# Patient Record
Sex: MALE | Race: WHITE | NOT HISPANIC OR LATINO
[De-identification: names, ages, dates, MRNs, and addresses within clinical notes are randomized per-mention and may not be internally consistent; named-entity substitution may affect disease eponyms.]

---

## 2020-05-31 ENCOUNTER — TRANSCRIPTION ENCOUNTER (OUTPATIENT)
Age: 50
End: 2020-05-31

## 2020-07-04 ENCOUNTER — EMERGENCY (EMERGENCY)
Facility: HOSPITAL | Age: 50
LOS: 1 days | Discharge: HOME | End: 2020-07-04
Attending: EMERGENCY MEDICINE | Admitting: EMERGENCY MEDICINE
Payer: COMMERCIAL

## 2020-07-04 VITALS
WEIGHT: 279.99 LBS | SYSTOLIC BLOOD PRESSURE: 138 MMHG | HEART RATE: 73 BPM | DIASTOLIC BLOOD PRESSURE: 65 MMHG | HEIGHT: 72 IN | TEMPERATURE: 98 F | RESPIRATION RATE: 18 BRPM | OXYGEN SATURATION: 97 %

## 2020-07-04 DIAGNOSIS — Z90.49 ACQUIRED ABSENCE OF OTHER SPECIFIED PARTS OF DIGESTIVE TRACT: Chronic | ICD-10-CM

## 2020-07-04 LAB
ALBUMIN SERPL ELPH-MCNC: 4.1 G/DL — SIGNIFICANT CHANGE UP (ref 3.3–5)
ALP SERPL-CCNC: 57 U/L — SIGNIFICANT CHANGE UP (ref 40–120)
ALT FLD-CCNC: 68 U/L — HIGH (ref 10–45)
ANION GAP SERPL CALC-SCNC: 13 MMOL/L — SIGNIFICANT CHANGE UP (ref 5–17)
APTT BLD: 29 SEC — SIGNIFICANT CHANGE UP (ref 27.5–35.5)
AST SERPL-CCNC: 37 U/L — SIGNIFICANT CHANGE UP (ref 10–40)
BASOPHILS # BLD AUTO: 0.03 K/UL — SIGNIFICANT CHANGE UP (ref 0–0.2)
BASOPHILS NFR BLD AUTO: 0.4 % — SIGNIFICANT CHANGE UP (ref 0–2)
BILIRUB SERPL-MCNC: 0.5 MG/DL — SIGNIFICANT CHANGE UP (ref 0.2–1.2)
BUN SERPL-MCNC: 16 MG/DL — SIGNIFICANT CHANGE UP (ref 7–23)
CALCIUM SERPL-MCNC: 8.7 MG/DL — SIGNIFICANT CHANGE UP (ref 8.4–10.5)
CHLORIDE SERPL-SCNC: 105 MMOL/L — SIGNIFICANT CHANGE UP (ref 96–108)
CO2 SERPL-SCNC: 22 MMOL/L — SIGNIFICANT CHANGE UP (ref 22–31)
CREAT SERPL-MCNC: 1.23 MG/DL — SIGNIFICANT CHANGE UP (ref 0.5–1.3)
EOSINOPHIL # BLD AUTO: 0.07 K/UL — SIGNIFICANT CHANGE UP (ref 0–0.5)
EOSINOPHIL NFR BLD AUTO: 1 % — SIGNIFICANT CHANGE UP (ref 0–6)
GLUCOSE SERPL-MCNC: 135 MG/DL — HIGH (ref 70–99)
HCT VFR BLD CALC: 40.1 % — SIGNIFICANT CHANGE UP (ref 39–50)
HGB BLD-MCNC: 14.6 G/DL — SIGNIFICANT CHANGE UP (ref 13–17)
IMM GRANULOCYTES NFR BLD AUTO: 0.3 % — SIGNIFICANT CHANGE UP (ref 0–1.5)
INR BLD: 1.02 — SIGNIFICANT CHANGE UP (ref 0.88–1.16)
LIDOCAIN IGE QN: 36 U/L — SIGNIFICANT CHANGE UP (ref 7–60)
LYMPHOCYTES # BLD AUTO: 1.55 K/UL — SIGNIFICANT CHANGE UP (ref 1–3.3)
LYMPHOCYTES # BLD AUTO: 21.4 % — SIGNIFICANT CHANGE UP (ref 13–44)
MCHC RBC-ENTMCNC: 34.4 PG — HIGH (ref 27–34)
MCHC RBC-ENTMCNC: 36.4 GM/DL — HIGH (ref 32–36)
MCV RBC AUTO: 94.6 FL — SIGNIFICANT CHANGE UP (ref 80–100)
MONOCYTES # BLD AUTO: 0.8 K/UL — SIGNIFICANT CHANGE UP (ref 0–0.9)
MONOCYTES NFR BLD AUTO: 11 % — SIGNIFICANT CHANGE UP (ref 2–14)
NEUTROPHILS # BLD AUTO: 4.77 K/UL — SIGNIFICANT CHANGE UP (ref 1.8–7.4)
NEUTROPHILS NFR BLD AUTO: 65.9 % — SIGNIFICANT CHANGE UP (ref 43–77)
NRBC # BLD: 0 /100 WBCS — SIGNIFICANT CHANGE UP (ref 0–0)
PLATELET # BLD AUTO: 187 K/UL — SIGNIFICANT CHANGE UP (ref 150–400)
POTASSIUM SERPL-MCNC: 4 MMOL/L — SIGNIFICANT CHANGE UP (ref 3.5–5.3)
POTASSIUM SERPL-SCNC: 4 MMOL/L — SIGNIFICANT CHANGE UP (ref 3.5–5.3)
PROT SERPL-MCNC: 7 G/DL — SIGNIFICANT CHANGE UP (ref 6–8.3)
PROTHROM AB SERPL-ACNC: 12.2 SEC — SIGNIFICANT CHANGE UP (ref 10.6–13.6)
RBC # BLD: 4.24 M/UL — SIGNIFICANT CHANGE UP (ref 4.2–5.8)
RBC # FLD: 11.7 % — SIGNIFICANT CHANGE UP (ref 10.3–14.5)
SODIUM SERPL-SCNC: 140 MMOL/L — SIGNIFICANT CHANGE UP (ref 135–145)
WBC # BLD: 7.24 K/UL — SIGNIFICANT CHANGE UP (ref 3.8–10.5)
WBC # FLD AUTO: 7.24 K/UL — SIGNIFICANT CHANGE UP (ref 3.8–10.5)

## 2020-07-04 PROCEDURE — 71045 X-RAY EXAM CHEST 1 VIEW: CPT | Mod: 26

## 2020-07-04 PROCEDURE — 99285 EMERGENCY DEPT VISIT HI MDM: CPT

## 2020-07-04 PROCEDURE — 74177 CT ABD & PELVIS W/CONTRAST: CPT | Mod: 26

## 2020-07-04 RX ORDER — ONDANSETRON 8 MG/1
4 TABLET, FILM COATED ORAL ONCE
Refills: 0 | Status: COMPLETED | OUTPATIENT
Start: 2020-07-04 | End: 2020-07-04

## 2020-07-04 RX ORDER — SODIUM CHLORIDE 9 MG/ML
1000 INJECTION INTRAMUSCULAR; INTRAVENOUS; SUBCUTANEOUS ONCE
Refills: 0 | Status: COMPLETED | OUTPATIENT
Start: 2020-07-04 | End: 2020-07-04

## 2020-07-04 RX ORDER — MORPHINE SULFATE 50 MG/1
6 CAPSULE, EXTENDED RELEASE ORAL ONCE
Refills: 0 | Status: DISCONTINUED | OUTPATIENT
Start: 2020-07-04 | End: 2020-07-04

## 2020-07-04 RX ADMIN — ONDANSETRON 4 MILLIGRAM(S): 8 TABLET, FILM COATED ORAL at 22:24

## 2020-07-04 RX ADMIN — SODIUM CHLORIDE 2000 MILLILITER(S): 9 INJECTION INTRAMUSCULAR; INTRAVENOUS; SUBCUTANEOUS at 22:29

## 2020-07-04 RX ADMIN — SODIUM CHLORIDE 2000 MILLILITER(S): 9 INJECTION INTRAMUSCULAR; INTRAVENOUS; SUBCUTANEOUS at 23:48

## 2020-07-04 RX ADMIN — SODIUM CHLORIDE 1000 MILLILITER(S): 9 INJECTION INTRAMUSCULAR; INTRAVENOUS; SUBCUTANEOUS at 23:34

## 2020-07-04 RX ADMIN — MORPHINE SULFATE 6 MILLIGRAM(S): 50 CAPSULE, EXTENDED RELEASE ORAL at 22:24

## 2020-07-04 RX ADMIN — MORPHINE SULFATE 6 MILLIGRAM(S): 50 CAPSULE, EXTENDED RELEASE ORAL at 22:56

## 2020-07-04 NOTE — ED PROVIDER NOTE - PATIENT PORTAL LINK FT
You can access the FollowMyHealth Patient Portal offered by [facility] by registering at the following website: [email or web address]. By joining [email or web address]’s FollowMyHealth portal, you will also be able to view your health information using other applications (apps) compatible with our system.

## 2020-07-04 NOTE — ED PROVIDER NOTE - CLINICAL SUMMARY MEDICAL DECISION MAKING FREE TEXT BOX
here w/ severe abdominal pain, n/v, stopped passing flatus. will check CT scan to r/o SBO, other dangerous pathology

## 2020-07-04 NOTE — ED ADULT NURSE NOTE - OBJECTIVE STATEMENT
pt to ED for abd. pain started 7am this morning accompanied n/v/d, last BM 2 hours ago, h/o hemicolectomy d/t volvulus, appendectomy. denies blood in urine/vomitus/stool, dysuria, sob, cp, dizziness, fever/chills/cough.

## 2020-07-04 NOTE — ED PROVIDER NOTE - OBJECTIVE STATEMENT
here w/ severe pain that started today 7am, unable to keep dinner down. last BM a few hours ago but felt abnl, pt cannot express how. states no flatus since. no sob/cp, no fevers/chills, no urinary symptoms. no dizziness. states similar to pain when he had volvulus. [place]

## 2020-07-04 NOTE — ED PROVIDER NOTE - NSFOLLOWUPINSTRUCTIONS_ED_ALL_ED_FT
Kidney Stones    Kidney stones (urolithiasis) are crystal deposits that form inside your kidneys. Pain is caused by the stone moving through the urinary tract, causing spasms of the ureter. Drink enough water and fluids to keep your urine clear or pale yellow. This will help you to pass the stone or stone fragments. If provided a strainer, strain all urine and keep all particulate matter and stones for a follow up appointment with a urologist.    SEEK IMMEDIATE MEDICAL CARE IF YOU HAVE ANY OF THE FOLLOWING SYMPTOMS: pain not controlled with medication, fever/chills, worsening vomiting, inability to urinate, or dizziness/lightheadedness.     Please follow up with your urologist, call Monday for an appointment

## 2020-07-05 VITALS
OXYGEN SATURATION: 97 % | SYSTOLIC BLOOD PRESSURE: 156 MMHG | TEMPERATURE: 98 F | RESPIRATION RATE: 18 BRPM | DIASTOLIC BLOOD PRESSURE: 92 MMHG | HEART RATE: 66 BPM

## 2020-07-05 LAB
BLD GP AB SCN SERPL QL: NEGATIVE — SIGNIFICANT CHANGE UP
RH IG SCN BLD-IMP: POSITIVE — SIGNIFICANT CHANGE UP
SARS-COV-2 RNA SPEC QL NAA+PROBE: SIGNIFICANT CHANGE UP

## 2020-07-05 PROCEDURE — 99284 EMERGENCY DEPT VISIT MOD MDM: CPT | Mod: 25

## 2020-07-05 PROCEDURE — 80053 COMPREHEN METABOLIC PANEL: CPT

## 2020-07-05 PROCEDURE — 85610 PROTHROMBIN TIME: CPT

## 2020-07-05 PROCEDURE — 86901 BLOOD TYPING SEROLOGIC RH(D): CPT

## 2020-07-05 PROCEDURE — 96361 HYDRATE IV INFUSION ADD-ON: CPT

## 2020-07-05 PROCEDURE — 96376 TX/PRO/DX INJ SAME DRUG ADON: CPT

## 2020-07-05 PROCEDURE — 36415 COLL VENOUS BLD VENIPUNCTURE: CPT

## 2020-07-05 PROCEDURE — 96375 TX/PRO/DX INJ NEW DRUG ADDON: CPT

## 2020-07-05 PROCEDURE — U0003: CPT

## 2020-07-05 PROCEDURE — 85730 THROMBOPLASTIN TIME PARTIAL: CPT

## 2020-07-05 PROCEDURE — 85025 COMPLETE CBC W/AUTO DIFF WBC: CPT

## 2020-07-05 PROCEDURE — 86850 RBC ANTIBODY SCREEN: CPT

## 2020-07-05 PROCEDURE — 83690 ASSAY OF LIPASE: CPT

## 2020-07-05 PROCEDURE — 74177 CT ABD & PELVIS W/CONTRAST: CPT

## 2020-07-05 PROCEDURE — 71045 X-RAY EXAM CHEST 1 VIEW: CPT

## 2020-07-05 PROCEDURE — 96374 THER/PROPH/DIAG INJ IV PUSH: CPT

## 2020-07-05 RX ORDER — TAMSULOSIN HYDROCHLORIDE 0.4 MG/1
1 CAPSULE ORAL
Qty: 7 | Refills: 0
Start: 2020-07-05 | End: 2020-07-11

## 2020-07-05 RX ORDER — IBUPROFEN 200 MG
1 TABLET ORAL
Qty: 16 | Refills: 0
Start: 2020-07-05 | End: 2020-07-08

## 2020-07-05 RX ORDER — ONDANSETRON 8 MG/1
1 TABLET, FILM COATED ORAL
Qty: 9 | Refills: 0
Start: 2020-07-05 | End: 2020-07-07

## 2020-07-05 RX ORDER — TAMSULOSIN HYDROCHLORIDE 0.4 MG/1
0.4 CAPSULE ORAL ONCE
Refills: 0 | Status: COMPLETED | OUTPATIENT
Start: 2020-07-05 | End: 2020-07-05

## 2020-07-05 RX ORDER — KETOROLAC TROMETHAMINE 30 MG/ML
30 SYRINGE (ML) INJECTION ONCE
Refills: 0 | Status: DISCONTINUED | OUTPATIENT
Start: 2020-07-05 | End: 2020-07-05

## 2020-07-05 RX ORDER — ACETAMINOPHEN 500 MG
1000 TABLET ORAL ONCE
Refills: 0 | Status: COMPLETED | OUTPATIENT
Start: 2020-07-05 | End: 2020-07-05

## 2020-07-05 RX ADMIN — Medication 30 MILLIGRAM(S): at 00:02

## 2020-07-05 RX ADMIN — Medication 400 MILLIGRAM(S): at 00:02

## 2020-07-05 RX ADMIN — TAMSULOSIN HYDROCHLORIDE 0.4 MILLIGRAM(S): 0.4 CAPSULE ORAL at 00:43

## 2020-07-07 ENCOUNTER — INPATIENT (INPATIENT)
Facility: HOSPITAL | Age: 50
LOS: 0 days | Discharge: HOME | DRG: 694 | End: 2020-07-08
Attending: UROLOGY | Admitting: UROLOGY
Payer: COMMERCIAL

## 2020-07-07 ENCOUNTER — APPOINTMENT (OUTPATIENT)
Dept: UROLOGY | Facility: CLINIC | Age: 50
End: 2020-07-07
Payer: COMMERCIAL

## 2020-07-07 ENCOUNTER — TRANSCRIPTION ENCOUNTER (OUTPATIENT)
Age: 50
End: 2020-07-07

## 2020-07-07 VITALS
RESPIRATION RATE: 18 BRPM | HEART RATE: 89 BPM | TEMPERATURE: 98 F | WEIGHT: 279.99 LBS | DIASTOLIC BLOOD PRESSURE: 91 MMHG | HEIGHT: 72 IN | OXYGEN SATURATION: 96 % | SYSTOLIC BLOOD PRESSURE: 158 MMHG

## 2020-07-07 DIAGNOSIS — N23 UNSPECIFIED RENAL COLIC: ICD-10-CM

## 2020-07-07 DIAGNOSIS — Z90.49 ACQUIRED ABSENCE OF OTHER SPECIFIED PARTS OF DIGESTIVE TRACT: Chronic | ICD-10-CM

## 2020-07-07 LAB
ALBUMIN SERPL ELPH-MCNC: 3.6 G/DL — SIGNIFICANT CHANGE UP (ref 3.3–5)
ALP SERPL-CCNC: 52 U/L — SIGNIFICANT CHANGE UP (ref 40–120)
ALT FLD-CCNC: 36 U/L — SIGNIFICANT CHANGE UP (ref 10–45)
ANION GAP SERPL CALC-SCNC: 11 MMOL/L — SIGNIFICANT CHANGE UP (ref 5–17)
APTT BLD: 29.9 SEC — SIGNIFICANT CHANGE UP (ref 27.5–35.5)
AST SERPL-CCNC: 20 U/L — SIGNIFICANT CHANGE UP (ref 10–40)
BASOPHILS # BLD AUTO: 0.02 K/UL — SIGNIFICANT CHANGE UP (ref 0–0.2)
BASOPHILS NFR BLD AUTO: 0.3 % — SIGNIFICANT CHANGE UP (ref 0–2)
BILIRUB SERPL-MCNC: 0.8 MG/DL — SIGNIFICANT CHANGE UP (ref 0.2–1.2)
BUN SERPL-MCNC: 15 MG/DL — SIGNIFICANT CHANGE UP (ref 7–23)
CALCIUM SERPL-MCNC: 8.4 MG/DL — SIGNIFICANT CHANGE UP (ref 8.4–10.5)
CHLORIDE SERPL-SCNC: 104 MMOL/L — SIGNIFICANT CHANGE UP (ref 96–108)
CO2 SERPL-SCNC: 27 MMOL/L — SIGNIFICANT CHANGE UP (ref 22–31)
CREAT SERPL-MCNC: 1.83 MG/DL — HIGH (ref 0.5–1.3)
EOSINOPHIL # BLD AUTO: 0.1 K/UL — SIGNIFICANT CHANGE UP (ref 0–0.5)
EOSINOPHIL NFR BLD AUTO: 1.5 % — SIGNIFICANT CHANGE UP (ref 0–6)
GLUCOSE SERPL-MCNC: 106 MG/DL — HIGH (ref 70–99)
HCT VFR BLD CALC: 37.2 % — LOW (ref 39–50)
HGB BLD-MCNC: 13.1 G/DL — SIGNIFICANT CHANGE UP (ref 13–17)
IMM GRANULOCYTES NFR BLD AUTO: 0.4 % — SIGNIFICANT CHANGE UP (ref 0–1.5)
INR BLD: 1.05 — SIGNIFICANT CHANGE UP (ref 0.88–1.16)
LYMPHOCYTES # BLD AUTO: 1.09 K/UL — SIGNIFICANT CHANGE UP (ref 1–3.3)
LYMPHOCYTES # BLD AUTO: 15.9 % — SIGNIFICANT CHANGE UP (ref 13–44)
MCHC RBC-ENTMCNC: 34.1 PG — HIGH (ref 27–34)
MCHC RBC-ENTMCNC: 35.2 GM/DL — SIGNIFICANT CHANGE UP (ref 32–36)
MCV RBC AUTO: 96.9 FL — SIGNIFICANT CHANGE UP (ref 80–100)
MONOCYTES # BLD AUTO: 0.77 K/UL — SIGNIFICANT CHANGE UP (ref 0–0.9)
MONOCYTES NFR BLD AUTO: 11.2 % — SIGNIFICANT CHANGE UP (ref 2–14)
NEUTROPHILS # BLD AUTO: 4.84 K/UL — SIGNIFICANT CHANGE UP (ref 1.8–7.4)
NEUTROPHILS NFR BLD AUTO: 70.7 % — SIGNIFICANT CHANGE UP (ref 43–77)
NRBC # BLD: 0 /100 WBCS — SIGNIFICANT CHANGE UP (ref 0–0)
PLATELET # BLD AUTO: 147 K/UL — LOW (ref 150–400)
POTASSIUM SERPL-MCNC: 3.8 MMOL/L — SIGNIFICANT CHANGE UP (ref 3.5–5.3)
POTASSIUM SERPL-SCNC: 3.8 MMOL/L — SIGNIFICANT CHANGE UP (ref 3.5–5.3)
PROT SERPL-MCNC: 6.5 G/DL — SIGNIFICANT CHANGE UP (ref 6–8.3)
PROTHROM AB SERPL-ACNC: 12.6 SEC — SIGNIFICANT CHANGE UP (ref 10.6–13.6)
RBC # BLD: 3.84 M/UL — LOW (ref 4.2–5.8)
RBC # FLD: 11.6 % — SIGNIFICANT CHANGE UP (ref 10.3–14.5)
SARS-COV-2 RNA SPEC QL NAA+PROBE: SIGNIFICANT CHANGE UP
SODIUM SERPL-SCNC: 142 MMOL/L — SIGNIFICANT CHANGE UP (ref 135–145)
WBC # BLD: 6.85 K/UL — SIGNIFICANT CHANGE UP (ref 3.8–10.5)
WBC # FLD AUTO: 6.85 K/UL — SIGNIFICANT CHANGE UP (ref 3.8–10.5)

## 2020-07-07 PROCEDURE — 93010 ELECTROCARDIOGRAM REPORT: CPT | Mod: NC

## 2020-07-07 PROCEDURE — 99204 OFFICE O/P NEW MOD 45 MIN: CPT | Mod: 95

## 2020-07-07 PROCEDURE — 71045 X-RAY EXAM CHEST 1 VIEW: CPT | Mod: 26

## 2020-07-07 PROCEDURE — 99285 EMERGENCY DEPT VISIT HI MDM: CPT

## 2020-07-07 RX ORDER — OXYCODONE AND ACETAMINOPHEN 5; 325 MG/1; MG/1
1 TABLET ORAL EVERY 4 HOURS
Refills: 0 | Status: DISCONTINUED | OUTPATIENT
Start: 2020-07-07 | End: 2020-07-08

## 2020-07-07 RX ORDER — SODIUM CHLORIDE 9 MG/ML
1000 INJECTION INTRAMUSCULAR; INTRAVENOUS; SUBCUTANEOUS ONCE
Refills: 0 | Status: COMPLETED | OUTPATIENT
Start: 2020-07-07 | End: 2020-07-07

## 2020-07-07 RX ORDER — ONDANSETRON 8 MG/1
4 TABLET, FILM COATED ORAL ONCE
Refills: 0 | Status: COMPLETED | OUTPATIENT
Start: 2020-07-07 | End: 2020-07-07

## 2020-07-07 RX ORDER — ONDANSETRON 8 MG/1
4 TABLET, FILM COATED ORAL EVERY 8 HOURS
Refills: 0 | Status: DISCONTINUED | OUTPATIENT
Start: 2020-07-07 | End: 2020-07-08

## 2020-07-07 RX ORDER — SODIUM CHLORIDE 9 MG/ML
1000 INJECTION INTRAMUSCULAR; INTRAVENOUS; SUBCUTANEOUS
Refills: 0 | Status: DISCONTINUED | OUTPATIENT
Start: 2020-07-07 | End: 2020-07-08

## 2020-07-07 RX ORDER — ACETAMINOPHEN 500 MG
650 TABLET ORAL EVERY 6 HOURS
Refills: 0 | Status: DISCONTINUED | OUTPATIENT
Start: 2020-07-07 | End: 2020-07-08

## 2020-07-07 RX ORDER — LEVOTHYROXINE SODIUM 125 MCG
75 TABLET ORAL DAILY
Refills: 0 | Status: DISCONTINUED | OUTPATIENT
Start: 2020-07-07 | End: 2020-07-08

## 2020-07-07 RX ORDER — HYDROMORPHONE HYDROCHLORIDE 2 MG/ML
0.5 INJECTION INTRAMUSCULAR; INTRAVENOUS; SUBCUTANEOUS EVERY 4 HOURS
Refills: 0 | Status: DISCONTINUED | OUTPATIENT
Start: 2020-07-07 | End: 2020-07-08

## 2020-07-07 RX ORDER — KETOROLAC TROMETHAMINE 30 MG/ML
15 SYRINGE (ML) INJECTION ONCE
Refills: 0 | Status: DISCONTINUED | OUTPATIENT
Start: 2020-07-07 | End: 2020-07-07

## 2020-07-07 RX ADMIN — HYDROMORPHONE HYDROCHLORIDE 0.5 MILLIGRAM(S): 2 INJECTION INTRAMUSCULAR; INTRAVENOUS; SUBCUTANEOUS at 23:00

## 2020-07-07 RX ADMIN — OXYCODONE AND ACETAMINOPHEN 1 TABLET(S): 5; 325 TABLET ORAL at 20:41

## 2020-07-07 RX ADMIN — Medication 650 MILLIGRAM(S): at 23:01

## 2020-07-07 RX ADMIN — SODIUM CHLORIDE 1000 MILLILITER(S): 9 INJECTION INTRAMUSCULAR; INTRAVENOUS; SUBCUTANEOUS at 14:27

## 2020-07-07 RX ADMIN — SODIUM CHLORIDE 1000 MILLILITER(S): 9 INJECTION INTRAMUSCULAR; INTRAVENOUS; SUBCUTANEOUS at 15:21

## 2020-07-07 RX ADMIN — OXYCODONE AND ACETAMINOPHEN 1 TABLET(S): 5; 325 TABLET ORAL at 16:38

## 2020-07-07 RX ADMIN — Medication 75 MICROGRAM(S): at 23:00

## 2020-07-07 RX ADMIN — SODIUM CHLORIDE 125 MILLILITER(S): 9 INJECTION INTRAMUSCULAR; INTRAVENOUS; SUBCUTANEOUS at 15:20

## 2020-07-07 RX ADMIN — ONDANSETRON 4 MILLIGRAM(S): 8 TABLET, FILM COATED ORAL at 14:27

## 2020-07-07 RX ADMIN — Medication 15 MILLIGRAM(S): at 14:27

## 2020-07-07 NOTE — ED PROVIDER NOTE - OBJECTIVE STATEMENT
pmhx hypothyroid, appendectomy, ascending colon resection, presenting to the ed with llq pain. Began July 4 and diagnosed with a kidney stone on the left side. pt dc home from er and given prescriptions flomax, zofran motrin and percocet. states pain is persisting. states had follow up tele-medicine appointment with Dr. Davis, who recommended pt come to er for further evaluation and admission. pt does nto endorse fevers chills chest pain palpitations cough sob nausea, change in bowel movements. no covid like symptoms.

## 2020-07-07 NOTE — H&P ADULT - PROBLEM SELECTOR PLAN 1
-admit  -IVF's  -pain control  -zofran prn  -will add on to OR schedule 7/8 for cystoscopy, possible laser lithotripsy, stent

## 2020-07-07 NOTE — ED PROVIDER NOTE - PHYSICAL EXAMINATION
General: Patient is well developed and well nourised. Patient is alert and oriented to person, place and date. Patient is laying comfortably in stretcher and appears in no acute distress.  HEENT: Head is normocephalic and atraumatic. Pupils are equal, round and reactive to light and accommodation. Extraocular movements intact. No evidence of nystagmus, conjunctival injection, or scleral icterus. External ears symmetric and non-tender without evidence of discharge. Ear canals are clear without evidence of edema or erythema. Cone of light evident on tympanic membrade without evidence of erythema, retraction or bulge. No hemotympanum present bilaterally.  Nose is symmetric, non-tender, patent without evidence of discharge. Teeth in good repair. Uvula midline. Pharynx without erythema, edema, tonsillar enlargement or exudates.   Neck: Supple and nontender, with no evidence of lymphadenopathy. No cervical spinal tenderness. Full range of motion.  Heart: Regular rate and rhythm. No murmurs, rubs or gallops.   Lungs: Clear to auscultation bilaterally with equal chest expansion. No note of wheezes, rhonchi, rales. Equal chest expansion. No note of retractions.  Abdomen: Bowel sounds present in all four quadrants. Soft, non-tender, non-distended without signs of masses, rebound or guarding. No note of hepatosplenomegaly. No CVA tenderness bilaterally. Negative Sterling sign. No pain present over McBurney's point.  Musculoskeletal: No edema, erythema, ecchymosis, atrophy or deformity. Full range of motion in all four extremities.  No clubbing or cyanosis. No point tenderness to palpation.   Neuro: Cranial nerves intact. GCS 15. Moving all extremities without discomfort. Sensation intact. Gait steady  Skin: Warm, dry and intact without evidence of rashes, bruising, pallor, jaundice or cyanosis.   Psych: Mood and affect appropriate.

## 2020-07-07 NOTE — ED PROVIDER NOTE - CLINICAL SUMMARY MEDICAL DECISION MAKING FREE TEXT BOX
49 year old male pmhx hypothyroid, appendectomy, ascending colon resection, presenting to the ed with llq pain. Began July 4 and diagnosed with a kidney stone on the left side. pt dc home from er and given prescriptions flomax, zofran motrin and percocet. states pain is persisting. states had follow up tele-medicine appointment with Dr. Davis, who recommended pt come to er for further evaluation and admission. on exam pt appears uncomfortable. lungs cta, heart rrr, abdomen soft. urology consulted. plan for pre-op labs for admission for surgery tomorrow. pt and wife agreeable to plan.

## 2020-07-07 NOTE — ASSESSMENT
[FreeTextEntry1] : renal colic refractory to medical management\par he does not look well by video\par no fevers\par I advised him to come in to [facility] for admission for hydration and likely stone surgery\par We spoke at length today about the kidney stone. Options, including medical expulsive therapy, ESWL, ureteroscopic stone extraction and PCNL were discussed at length. Given the stone size and location, and inability to pass the stone ureteroscopy is likely to provide the highest stone free rates. Risks - including an inability to definitively treat the stone, infections including urosepsis, ureteral stricture and injury, gross hematuria, need for rodriguez catheterization, post op pain were all discussed. The patient very clearly understands the need for a temporary internal ureteral stent which MUST be removed or changed within 3 months of placement. \par will come in to ER for eval\par

## 2020-07-07 NOTE — HISTORY OF PRESENT ILLNESS
[Medical Office: ([facility])___] : at the medical office located in  [Home] : at home, [unfilled] , at the time of the visit. [Verbal consent obtained from patient] : the patient, [unfilled] [FreeTextEntry1] : The patient-doctor relationship has been established in a face to face fashion via real time video/audio HIPAA compliant communication using telemedicine software.  The patient's identity has been confirmed.  The patient was previously emailed a copy of the telemedicine consent.  They have had a chance to review and has now given verbal consent and has requested care to be assessed and treated through telemedicine and understands there maybe limitations in this process and they may need further follow up care in the office and or hospital settings.   \par  \par 49M seen in ER 7/4 with 3mm left distal ureteral stone. no leukocystosis. Creat 1.2. was sent home with percocet/flomax/antinausea medication. pain was initially up to 10/10 and remains persistent. +colic like. +nausea. poor PO intake. unable to work. no clear relief from pain medicaiton. no fevers. no dysuria. no hematuria. no previous stones. no family history prostate kidney bladder cancer.

## 2020-07-07 NOTE — ED ADULT NURSE NOTE - OBJECTIVE STATEMENT
Pt is 49y male presented w/ c/o L flank pain associated w/ nausea. Pt recently seen for similar symptoms and dx w/ kidney stones.

## 2020-07-07 NOTE — ED ADULT TRIAGE NOTE - CHIEF COMPLAINT QUOTE
Patient c/o left flank pain and nausea since saturday . Was sent by Dr. Davis for evaluation . History of kidney stone .

## 2020-07-07 NOTE — H&P ADULT - ASSESSMENT
50 yo male with 3mm L distal ureteral stone with mild hydronephrosis with severe L flank pain and nausea not relieved with pain medications.

## 2020-07-07 NOTE — H&P ADULT - HISTORY OF PRESENT ILLNESS
50 yo male presents to ER c/o severe L flank pain. pmhx hypothyroid, appendectomy, ascending colon resection, presenting to the ed with llq pain. Began July 4 and diagnosed with a kidney stone on the left side. pt dc home from er and given prescriptions flomax, zofran motrin and percocet. states pain is persisting. states had follow up tele-medicine appointment with Dr. Davis, who recommended pt come to er for further evaluation and admission. pt does nto endorse fevers chills chest pain palpitations cough sob nausea, change in bowel movements. no covid like symptoms.

## 2020-07-08 ENCOUNTER — TRANSCRIPTION ENCOUNTER (OUTPATIENT)
Age: 50
End: 2020-07-08

## 2020-07-08 VITALS
SYSTOLIC BLOOD PRESSURE: 145 MMHG | DIASTOLIC BLOOD PRESSURE: 87 MMHG | RESPIRATION RATE: 17 BRPM | OXYGEN SATURATION: 98 % | HEART RATE: 65 BPM | TEMPERATURE: 98 F

## 2020-07-08 DIAGNOSIS — N20.1 CALCULUS OF URETER: ICD-10-CM

## 2020-07-08 DIAGNOSIS — Z20.828 CONTACT WITH AND (SUSPECTED) EXPOSURE TO OTHER VIRAL COMMUNICABLE DISEASES: ICD-10-CM

## 2020-07-08 DIAGNOSIS — R10.84 GENERALIZED ABDOMINAL PAIN: ICD-10-CM

## 2020-07-08 LAB
ANION GAP SERPL CALC-SCNC: 13 MMOL/L — SIGNIFICANT CHANGE UP (ref 5–17)
BUN SERPL-MCNC: 15 MG/DL — SIGNIFICANT CHANGE UP (ref 7–23)
CALCIUM SERPL-MCNC: 8 MG/DL — LOW (ref 8.4–10.5)
CHLORIDE SERPL-SCNC: 105 MMOL/L — SIGNIFICANT CHANGE UP (ref 96–108)
CO2 SERPL-SCNC: 22 MMOL/L — SIGNIFICANT CHANGE UP (ref 22–31)
CREAT SERPL-MCNC: 1.65 MG/DL — HIGH (ref 0.5–1.3)
GLUCOSE SERPL-MCNC: 101 MG/DL — HIGH (ref 70–99)
HCT VFR BLD CALC: 35.6 % — LOW (ref 39–50)
HGB BLD-MCNC: 12.3 G/DL — LOW (ref 13–17)
MAGNESIUM SERPL-MCNC: 2.1 MG/DL — SIGNIFICANT CHANGE UP (ref 1.6–2.6)
MCHC RBC-ENTMCNC: 33.9 PG — SIGNIFICANT CHANGE UP (ref 27–34)
MCHC RBC-ENTMCNC: 34.6 GM/DL — SIGNIFICANT CHANGE UP (ref 32–36)
MCV RBC AUTO: 98.1 FL — SIGNIFICANT CHANGE UP (ref 80–100)
NRBC # BLD: 0 /100 WBCS — SIGNIFICANT CHANGE UP (ref 0–0)
PHOSPHATE SERPL-MCNC: 2.8 MG/DL — SIGNIFICANT CHANGE UP (ref 2.5–4.5)
PLATELET # BLD AUTO: 153 K/UL — SIGNIFICANT CHANGE UP (ref 150–400)
POTASSIUM SERPL-MCNC: 4.2 MMOL/L — SIGNIFICANT CHANGE UP (ref 3.5–5.3)
POTASSIUM SERPL-SCNC: 4.2 MMOL/L — SIGNIFICANT CHANGE UP (ref 3.5–5.3)
RBC # BLD: 3.63 M/UL — LOW (ref 4.2–5.8)
RBC # FLD: 11.6 % — SIGNIFICANT CHANGE UP (ref 10.3–14.5)
SODIUM SERPL-SCNC: 140 MMOL/L — SIGNIFICANT CHANGE UP (ref 135–145)
WBC # BLD: 7 K/UL — SIGNIFICANT CHANGE UP (ref 3.8–10.5)
WBC # FLD AUTO: 7 K/UL — SIGNIFICANT CHANGE UP (ref 3.8–10.5)

## 2020-07-08 PROCEDURE — 52332 CYSTOSCOPY AND TREATMENT: CPT | Mod: LT

## 2020-07-08 PROCEDURE — 74420 UROGRAPHY RTRGR +-KUB: CPT | Mod: 26

## 2020-07-08 RX ORDER — LEVOTHYROXINE SODIUM 125 MCG
75 TABLET ORAL DAILY
Refills: 0 | Status: DISCONTINUED | OUTPATIENT
Start: 2020-07-08 | End: 2020-07-08

## 2020-07-08 RX ORDER — ACETAMINOPHEN 500 MG
650 TABLET ORAL EVERY 6 HOURS
Refills: 0 | Status: DISCONTINUED | OUTPATIENT
Start: 2020-07-08 | End: 2020-07-08

## 2020-07-08 RX ORDER — OXYCODONE AND ACETAMINOPHEN 5; 325 MG/1; MG/1
1 TABLET ORAL EVERY 4 HOURS
Refills: 0 | Status: DISCONTINUED | OUTPATIENT
Start: 2020-07-08 | End: 2020-07-08

## 2020-07-08 RX ORDER — CEFTRIAXONE 500 MG/1
1000 INJECTION, POWDER, FOR SOLUTION INTRAMUSCULAR; INTRAVENOUS EVERY 24 HOURS
Refills: 0 | Status: DISCONTINUED | OUTPATIENT
Start: 2020-07-08 | End: 2020-07-08

## 2020-07-08 RX ORDER — LEVOTHYROXINE SODIUM 125 MCG
1 TABLET ORAL
Qty: 0 | Refills: 0 | DISCHARGE
Start: 2020-07-08

## 2020-07-08 RX ORDER — HYDROMORPHONE HYDROCHLORIDE 2 MG/ML
0.5 INJECTION INTRAMUSCULAR; INTRAVENOUS; SUBCUTANEOUS EVERY 4 HOURS
Refills: 0 | Status: DISCONTINUED | OUTPATIENT
Start: 2020-07-08 | End: 2020-07-08

## 2020-07-08 RX ORDER — CEFTRIAXONE 500 MG/1
1000 INJECTION, POWDER, FOR SOLUTION INTRAMUSCULAR; INTRAVENOUS EVERY 24 HOURS
Refills: 0 | Status: DISCONTINUED | OUTPATIENT
Start: 2020-07-09 | End: 2020-07-08

## 2020-07-08 RX ORDER — ONDANSETRON 8 MG/1
4 TABLET, FILM COATED ORAL EVERY 8 HOURS
Refills: 0 | Status: DISCONTINUED | OUTPATIENT
Start: 2020-07-08 | End: 2020-07-08

## 2020-07-08 RX ORDER — CEFPODOXIME PROXETIL 100 MG
1 TABLET ORAL
Qty: 10 | Refills: 0
Start: 2020-07-08 | End: 2020-07-12

## 2020-07-08 RX ADMIN — CEFTRIAXONE 100 MILLIGRAM(S): 500 INJECTION, POWDER, FOR SOLUTION INTRAMUSCULAR; INTRAVENOUS at 07:51

## 2020-07-08 NOTE — DISCHARGE NOTE PROVIDER - HOSPITAL COURSE
48 yo male admitted for L renal colic. OR 7/8: cysto, L stent. Tolerated procedure well. Uneventful post op course.     Tolerates PO. Voiding well. F/U as outpt.

## 2020-07-08 NOTE — DISCHARGE NOTE PROVIDER - NSDCCPCAREPLAN_GEN_ALL_CORE_FT
PRINCIPAL DISCHARGE DIAGNOSIS  Diagnosis: Renal colic on left side  Assessment and Plan of Treatment: Call MD for increase abdominal pain, nausea, vomiting, temperature >101.4F, or for difficulty voiding.  Follow up Dr. Davis in one week.  New script for cefpodoxime 200mg 2x/day for 5 days.

## 2020-07-08 NOTE — DISCHARGE NOTE PROVIDER - NSDCMRMEDTOKEN_GEN_ALL_CORE_FT
cefpodoxime 200 mg oral tablet: 1 tab(s) orally every 12 hours MDD:2   mg oral tablet: 1 tab(s) orally every 6 hours   levothyroxine 75 mcg (0.075 mg) oral tablet: 1 tab(s) orally once a day  ondansetron 4 mg oral tablet: 1-2 tab(s) orally every 8 hours, As Needed nausea/vomiting

## 2020-07-08 NOTE — PROGRESS NOTE ADULT - ASSESSMENT
48 yo male with 3mm L distal ureteral stone with mild hydronephrosis with severe L flank pain and nausea not relieved with pain medications.

## 2020-07-08 NOTE — DISCHARGE NOTE PROVIDER - NSDCACTIVITY_GEN_ALL_CORE
Walking - Indoors allowed/Showering allowed/Walking - Outdoors allowed/Stairs allowed/No heavy lifting/straining/Driving allowed

## 2020-07-08 NOTE — PROGRESS NOTE ADULT - SUBJECTIVE AND OBJECTIVE BOX
AM Note    Pt spiked fever 100.4 HR 69. Given PO Tylenol       Vital Signs Last 24 Hrs  T(C): 38 (07-07-20 @ 22:42), Max: 38 (07-07-20 @ 22:42)  T(F): 100.4 (07-07-20 @ 22:42), Max: 100.4 (07-07-20 @ 22:42)  HR: 69 (07-07-20 @ 22:42) (57 - 89)  BP: 166/76 (07-07-20 @ 22:42) (116/69 - 166/76)  BP(mean): --  RR: 20 (07-07-20 @ 22:42) (18 - 20)  SpO2: 97% (07-07-20 @ 22:42) (96% - 97%)     07 Jul 2020 14:38    142    |  104    |  15     ----------------------------<  106    3.8     |  27     |  1.83     Ca    8.4        07 Jul 2020 14:38    TPro  6.5    /  Alb  3.6    /  TBili  0.8    /  DBili  x      /  AST  20     /  ALT  36     /  AlkPhos  52     07 Jul 2020 14:38                          13.1   6.85  )-----------( 147      ( 07 Jul 2020 14:38 )             37.2         I&O's Summary    07 Jul 2020 07:01  -  08 Jul 2020 04:51  --------------------------------------------------------  IN: 625 mL / OUT: 300 mL / NET: 325 mL          PHYSICAL EXAM:    GEN: awake and alert  ABD: nontender, nondistended  : no suprapubic/CVAT

## 2020-07-08 NOTE — DISCHARGE NOTE NURSING/CASE MANAGEMENT/SOCIAL WORK - PATIENT PORTAL LINK FT
You can access the FollowMyHealth Patient Portal offered by [facility] by registering at the following website: [email or web address]. By joining [employer]’s FollowMyHealth portal, you will also be able to view your health information using other applications (apps) compatible with our system.

## 2020-07-08 NOTE — DISCHARGE NOTE PROVIDER - CARE PROVIDER_API CALL
Yong Davis  UROLOGY  [address] [place] [address]  Phone: [phone]  Fax: [phone]  Follow Up Time:

## 2020-07-08 NOTE — PROGRESS NOTE ADULT - PROBLEM SELECTOR PLAN 1
-IVF's  -pain control  -zofran prn  -NPO  -added on to OR schedule 7/8 for cystoscopy, possible laser lithotripsy, stent

## 2020-07-08 NOTE — BRIEF OPERATIVE NOTE - NSICDXBRIEFPROCEDURE_GEN_ALL_CORE_FT
PROCEDURES:  Cystoscopy, with retrograde pyelogram, adult 08-Jul-2020 11:25:00 Left Keegan Oh  Rigid cystoscopy 08-Jul-2020 11:24:52  Keegan Oh  Insertion of ureteral stent 08-Jul-2020 11:24:43 Left Keegan Oh PROCEDURES:  Cystoscopy, with retrograde pyelogram, adult 08-Jul-2020 11:25:00 Left Keegan Oh  Rigid cystoscopy 08-Jul-2020 11:24:52  Keegan Oh  Insertion of ureteral stent 08-Jul-2020 11:24:43 Left Keegan hO

## 2020-07-10 ENCOUNTER — APPOINTMENT (OUTPATIENT)
Dept: UROLOGY | Facility: CLINIC | Age: 50
End: 2020-07-10
Payer: COMMERCIAL

## 2020-07-10 ENCOUNTER — TRANSCRIPTION ENCOUNTER (OUTPATIENT)
Age: 50
End: 2020-07-10

## 2020-07-10 VITALS
HEIGHT: 72 IN | DIASTOLIC BLOOD PRESSURE: 85 MMHG | HEART RATE: 65 BPM | WEIGHT: 80 LBS | TEMPERATURE: 98 F | SYSTOLIC BLOOD PRESSURE: 145 MMHG | BODY MASS INDEX: 10.84 KG/M2

## 2020-07-10 DIAGNOSIS — R10.9 UNSPECIFIED ABDOMINAL PAIN: ICD-10-CM

## 2020-07-10 DIAGNOSIS — E66.9 OBESITY, UNSPECIFIED: ICD-10-CM

## 2020-07-10 DIAGNOSIS — E03.9 HYPOTHYROIDISM, UNSPECIFIED: ICD-10-CM

## 2020-07-10 DIAGNOSIS — N13.2 HYDRONEPHROSIS WITH RENAL AND URETERAL CALCULOUS OBSTRUCTION: ICD-10-CM

## 2020-07-10 PROCEDURE — 99213 OFFICE O/P EST LOW 20 MIN: CPT

## 2020-07-11 LAB
CULTURE RESULTS: NO GROWTH — SIGNIFICANT CHANGE UP
CULTURE RESULTS: NO GROWTH — SIGNIFICANT CHANGE UP
SPECIMEN SOURCE: SIGNIFICANT CHANGE UP
SPECIMEN SOURCE: SIGNIFICANT CHANGE UP

## 2020-07-13 PROCEDURE — 80053 COMPREHEN METABOLIC PANEL: CPT

## 2020-07-13 PROCEDURE — 87086 URINE CULTURE/COLONY COUNT: CPT

## 2020-07-13 PROCEDURE — 86850 RBC ANTIBODY SCREEN: CPT

## 2020-07-13 PROCEDURE — 71045 X-RAY EXAM CHEST 1 VIEW: CPT

## 2020-07-13 PROCEDURE — 93005 ELECTROCARDIOGRAM TRACING: CPT

## 2020-07-13 PROCEDURE — 96375 TX/PRO/DX INJ NEW DRUG ADDON: CPT

## 2020-07-13 PROCEDURE — 86901 BLOOD TYPING SEROLOGIC RH(D): CPT

## 2020-07-13 PROCEDURE — 36415 COLL VENOUS BLD VENIPUNCTURE: CPT

## 2020-07-13 PROCEDURE — 83735 ASSAY OF MAGNESIUM: CPT

## 2020-07-13 PROCEDURE — 80048 BASIC METABOLIC PNL TOTAL CA: CPT

## 2020-07-13 PROCEDURE — 99285 EMERGENCY DEPT VISIT HI MDM: CPT | Mod: 25

## 2020-07-13 PROCEDURE — 76000 FLUOROSCOPY <1 HR PHYS/QHP: CPT

## 2020-07-13 PROCEDURE — 85025 COMPLETE CBC W/AUTO DIFF WBC: CPT

## 2020-07-13 PROCEDURE — 96374 THER/PROPH/DIAG INJ IV PUSH: CPT

## 2020-07-13 PROCEDURE — 87635 SARS-COV-2 COVID-19 AMP PRB: CPT

## 2020-07-13 PROCEDURE — 85730 THROMBOPLASTIN TIME PARTIAL: CPT

## 2020-07-13 PROCEDURE — 85610 PROTHROMBIN TIME: CPT

## 2020-07-13 PROCEDURE — 84100 ASSAY OF PHOSPHORUS: CPT

## 2020-07-13 PROCEDURE — 85027 COMPLETE CBC AUTOMATED: CPT

## 2020-07-13 NOTE — HISTORY OF PRESENT ILLNESS
[FreeTextEntry1] : s/p stent insertion for stone \par fevers to 100.4 on dayh before definitive tx\par feels better\par pain improved\par now funcitonal

## 2020-07-13 NOTE — ASSESSMENT
[FreeTextEntry1] : ureteral stone\par he is hesiant to return to surgery so soon after previous\par will schedule CT in 2 weeks to eval if stone passed\par otherwise will schedule in OR

## 2020-07-14 LAB
ALBUMIN SERPL ELPH-MCNC: 4.1 G/DL
ALP BLD-CCNC: 51 U/L
ALT SERPL-CCNC: 59 U/L
ANION GAP SERPL CALC-SCNC: 16 MMOL/L
APPEARANCE: CLEAR
AST SERPL-CCNC: 50 U/L
BACTERIA UR CULT: NORMAL
BACTERIA: NEGATIVE
BILIRUB SERPL-MCNC: 0.3 MG/DL
BILIRUBIN URINE: NEGATIVE
BLOOD URINE: ABNORMAL
BUN SERPL-MCNC: 13 MG/DL
CALCIUM SERPL-MCNC: 8.9 MG/DL
CHLORIDE SERPL-SCNC: 104 MMOL/L
CO2 SERPL-SCNC: 22 MMOL/L
COLOR: NORMAL
CREAT SERPL-MCNC: 1.04 MG/DL
GLUCOSE QUALITATIVE U: NEGATIVE
GLUCOSE SERPL-MCNC: 97 MG/DL
HYALINE CASTS: 0 /LPF
KETONES URINE: NEGATIVE
LEUKOCYTE ESTERASE URINE: NEGATIVE
MICROSCOPIC-UA: NORMAL
NITRITE URINE: NEGATIVE
PH URINE: 6
POTASSIUM SERPL-SCNC: 4.1 MMOL/L
PROT SERPL-MCNC: 6.5 G/DL
PROTEIN URINE: NORMAL
RED BLOOD CELLS URINE: 105 /HPF
SODIUM SERPL-SCNC: 142 MMOL/L
SPECIFIC GRAVITY URINE: 1.02
SQUAMOUS EPITHELIAL CELLS: 0 /HPF
UROBILINOGEN URINE: NORMAL
WHITE BLOOD CELLS URINE: 2 /HPF

## 2020-07-15 ENCOUNTER — TRANSCRIPTION ENCOUNTER (OUTPATIENT)
Age: 50
End: 2020-07-15

## 2020-07-21 ENCOUNTER — OUTPATIENT (OUTPATIENT)
Dept: OUTPATIENT SERVICES | Facility: HOSPITAL | Age: 50
LOS: 1 days | End: 2020-07-21

## 2020-07-21 ENCOUNTER — RESULT REVIEW (OUTPATIENT)
Age: 50
End: 2020-07-21

## 2020-07-21 ENCOUNTER — APPOINTMENT (OUTPATIENT)
Dept: CT IMAGING | Facility: CLINIC | Age: 50
End: 2020-07-21
Payer: COMMERCIAL

## 2020-07-21 DIAGNOSIS — Z90.49 ACQUIRED ABSENCE OF OTHER SPECIFIED PARTS OF DIGESTIVE TRACT: Chronic | ICD-10-CM

## 2020-07-21 PROCEDURE — 74176 CT ABD & PELVIS W/O CONTRAST: CPT | Mod: 26

## 2020-07-23 ENCOUNTER — APPOINTMENT (OUTPATIENT)
Dept: UROLOGY | Facility: CLINIC | Age: 50
End: 2020-07-23
Payer: COMMERCIAL

## 2020-07-23 VITALS
HEIGHT: 72 IN | WEIGHT: 280 LBS | HEART RATE: 68 BPM | TEMPERATURE: 98 F | DIASTOLIC BLOOD PRESSURE: 81 MMHG | BODY MASS INDEX: 37.93 KG/M2 | SYSTOLIC BLOOD PRESSURE: 145 MMHG

## 2020-07-23 PROCEDURE — 52310 CYSTOSCOPY AND TREATMENT: CPT

## 2020-07-23 RX ORDER — TADALAFIL 5 MG/1
5 TABLET ORAL
Qty: 90 | Refills: 3 | Status: ACTIVE | COMMUNITY
Start: 2020-07-23 | End: 1900-01-01

## 2020-08-04 ENCOUNTER — TRANSCRIPTION ENCOUNTER (OUTPATIENT)
Age: 50
End: 2020-08-04

## 2020-08-04 PROBLEM — K37 UNSPECIFIED APPENDICITIS: Chronic | Status: ACTIVE | Noted: 2020-07-04

## 2020-08-04 PROBLEM — K56.2 VOLVULUS: Chronic | Status: ACTIVE | Noted: 2020-07-04

## 2020-08-04 PROBLEM — E03.9 HYPOTHYROIDISM, UNSPECIFIED: Chronic | Status: ACTIVE | Noted: 2020-07-04

## 2020-08-24 ENCOUNTER — APPOINTMENT (OUTPATIENT)
Dept: CT IMAGING | Facility: CLINIC | Age: 50
End: 2020-08-24
Payer: COMMERCIAL

## 2020-08-24 ENCOUNTER — RESULT REVIEW (OUTPATIENT)
Age: 50
End: 2020-08-24

## 2020-08-24 ENCOUNTER — OUTPATIENT (OUTPATIENT)
Dept: OUTPATIENT SERVICES | Facility: HOSPITAL | Age: 50
LOS: 1 days | End: 2020-08-24

## 2020-08-24 DIAGNOSIS — Z90.49 ACQUIRED ABSENCE OF OTHER SPECIFIED PARTS OF DIGESTIVE TRACT: Chronic | ICD-10-CM

## 2020-08-24 PROCEDURE — 74176 CT ABD & PELVIS W/O CONTRAST: CPT | Mod: 26

## 2020-09-03 ENCOUNTER — APPOINTMENT (OUTPATIENT)
Dept: UROLOGY | Facility: CLINIC | Age: 50
End: 2020-09-03
Payer: COMMERCIAL

## 2020-09-03 VITALS — TEMPERATURE: 98.5 F | DIASTOLIC BLOOD PRESSURE: 80 MMHG | HEART RATE: 60 BPM | SYSTOLIC BLOOD PRESSURE: 138 MMHG

## 2020-09-03 PROCEDURE — 99213 OFFICE O/P EST LOW 20 MIN: CPT

## 2020-09-03 NOTE — ASSESSMENT
[FreeTextEntry1] : ureteral stone\par dietary modifications discussed\par f/u in 6 months with PSA testing\par

## 2020-09-03 NOTE — HISTORY OF PRESENT ILLNESS
[FreeTextEntry1] : repeat imaging no stone noted\par feels well\par no hematuria\par no dysuria\par pain resolved\par 3mm stone at presentation\par first lifetime stone episode

## 2021-03-04 ENCOUNTER — APPOINTMENT (OUTPATIENT)
Dept: UROLOGY | Facility: CLINIC | Age: 51
End: 2021-03-04
Payer: COMMERCIAL

## 2021-03-04 VITALS — DIASTOLIC BLOOD PRESSURE: 98 MMHG | HEART RATE: 76 BPM | TEMPERATURE: 98.2 F | SYSTOLIC BLOOD PRESSURE: 179 MMHG

## 2021-03-04 DIAGNOSIS — N20.1 CALCULUS OF URETER: ICD-10-CM

## 2021-03-04 DIAGNOSIS — Z12.5 ENCOUNTER FOR SCREENING FOR MALIGNANT NEOPLASM OF PROSTATE: ICD-10-CM

## 2021-03-04 PROCEDURE — 99072 ADDL SUPL MATRL&STAF TM PHE: CPT

## 2021-03-04 PROCEDURE — 99213 OFFICE O/P EST LOW 20 MIN: CPT

## 2021-03-04 NOTE — ASSESSMENT
[FreeTextEntry1] : Ureteral stone, passed few months back\par no new stone events\par proper hydration and low oxalate diet\par Renal / Bladder US pending results\par if negative f/u in one year\par \par PSA done - low, no need to repeat for now\par

## 2021-03-04 NOTE — PHYSICAL EXAM
[General Appearance - Well Developed] : well developed [Bowel Sounds] : normal bowel sounds [Skin Color & Pigmentation] : normal skin color and pigmentation [Heart Rate And Rhythm] : Heart rate and rhythm were normal [] : no respiratory distress [Oriented To Time, Place, And Person] : oriented to person, place, and time [Not Anxious] : not anxious [Normal Station and Gait] : the gait and station were normal for the patient's age [No Focal Deficits] : no focal deficits

## 2021-03-04 NOTE — REASON FOR VISIT
[Follow-up Visit ___] : a follow-up visit  for [unfilled]
no abrasions, no jaundice, no lesions, no pruritis, and no rashes.

## 2021-03-04 NOTE — HISTORY OF PRESENT ILLNESS
[FreeTextEntry1] : 6 months f/u\par 3 mm stone passed while he had stent (now out), asymptomatic now\par no new stone attacks since last visit\par Renal U/S today, results pending likely negative\par Bladder U/S results pending\par Moderate hydration, diet for stone control aware\par \par ED -on Tadalafil 5 mg daily and Viagra on demand.\par \par Denies dysuria, frequency or hematuria\par \par PSA 0.2, 2/25/2021 (done at PCP office)

## 2021-04-19 NOTE — ED PROVIDER NOTE - CARE PLAN
PATIENTS GUARDIAN, AREMN, [phone]   Principal Discharge DX:	Kidney stone

## 2021-09-30 ENCOUNTER — APPOINTMENT (OUTPATIENT)
Dept: UROLOGY | Facility: CLINIC | Age: 51
End: 2021-09-30

## 2021-11-26 NOTE — ED ADULT TRIAGE NOTE - WEIGHT IN LBS
Patient comes to clinic for follow up anticoagulation visit.  Last INR on 11/22/2021 was 1.4.  Dose increased.   Today's INR is 2.0 and is within goal range.    Current warfarin total weekly dose of 42.5 mg verified.  Informed the INR result is within therapeutic range and instructed to maintain current dose per protocol. Discussed dose and return date of 11/30/2021 for next INR. See Anticoagulation flowsheet.    Dr Stokes is in the office today supervising the treatment.    Instructed to contact the clinic with any unusual bleeding or bruising, any changes in medications, diet, health status, lifestyle, or any other changes, questions or concerns. Verbalized understanding of all discussed.      279.9

## 2022-06-13 NOTE — ED ADULT NURSE NOTE - NS ED NURSE LEVEL OF CONSCIOUSNESS MENTAL STATUS
Alert/Cooperative/Awake

## 2023-01-04 ENCOUNTER — NON-APPOINTMENT (OUTPATIENT)
Age: 53
End: 2023-01-04

## 2024-01-06 NOTE — ED ADULT NURSE NOTE - BREATHING, MLM
-- DO NOT REPLY / DO NOT REPLY ALL --  -- Message is from Engagement Center Operations (ECO) --    ONLY TO BE USED WITHIN A REFILL MEDICATION ENCOUNTER    Med Refill  Is the patient currently having any symptoms?: Not Applicable, Pharmacy calling for refill    Name of medication requested: See pended med    Is this the first request for the medication in the last 48 hours?: Yes      Patient is requesting a medication refill - medication is on active list    Full name of the provider who ordered the medication: Claragaeren    [facility] site name / Account # for provider: [address] [place] [address]    Preferred Pharmacy: Pharmacy  [id number] - [facility] [id number]  [address]    Patient confirmed the above pharmacy as correct?  Yes    Caller Information         Type Contact Phone/Fax    01/06/2024 10:13 AM CST Phone (Incoming) Aliza Youssef (Self) [phone] (M)            Alternative phone number: no    Can a detailed message be left?: Yes         Spontaneous, unlabored and symmetrical

## 2024-02-06 ENCOUNTER — APPOINTMENT (OUTPATIENT)
Dept: OTOLARYNGOLOGY | Facility: CLINIC | Age: 54
End: 2024-02-06
Payer: COMMERCIAL

## 2024-02-06 VITALS
HEART RATE: 68 BPM | HEIGHT: 72 IN | DIASTOLIC BLOOD PRESSURE: 82 MMHG | BODY MASS INDEX: 37.25 KG/M2 | TEMPERATURE: 96.5 F | WEIGHT: 275 LBS | SYSTOLIC BLOOD PRESSURE: 155 MMHG | OXYGEN SATURATION: 99 %

## 2024-02-06 DIAGNOSIS — Z80.9 FAMILY HISTORY OF MALIGNANT NEOPLASM, UNSPECIFIED: ICD-10-CM

## 2024-02-06 DIAGNOSIS — Z82.49 FAMILY HISTORY OF ISCHEMIC HEART DISEASE AND OTHER DISEASES OF THE CIRCULATORY SYSTEM: ICD-10-CM

## 2024-02-06 DIAGNOSIS — I10 ESSENTIAL (PRIMARY) HYPERTENSION: ICD-10-CM

## 2024-02-06 DIAGNOSIS — H61.23 IMPACTED CERUMEN, BILATERAL: ICD-10-CM

## 2024-02-06 DIAGNOSIS — Z83.3 FAMILY HISTORY OF DIABETES MELLITUS: ICD-10-CM

## 2024-02-06 DIAGNOSIS — G47.33 OBSTRUCTIVE SLEEP APNEA (ADULT) (PEDIATRIC): ICD-10-CM

## 2024-02-06 DIAGNOSIS — M54.2 CERVICALGIA: ICD-10-CM

## 2024-02-06 DIAGNOSIS — Z78.9 OTHER SPECIFIED HEALTH STATUS: ICD-10-CM

## 2024-02-06 PROCEDURE — 99203 OFFICE O/P NEW LOW 30 MIN: CPT | Mod: 25

## 2024-02-06 PROCEDURE — 31575 DIAGNOSTIC LARYNGOSCOPY: CPT

## 2024-02-06 PROCEDURE — 69210 REMOVE IMPACTED EAR WAX UNI: CPT

## 2024-02-06 NOTE — PHYSICAL EXAM
[Binocular Microscopic Exam] : Binocular microscopic exam was performed [Removed] : palatine tonsils previously removed [Laryngoscopy Performed] : laryngoscopy was performed, see procedure section for findings [Normal] : no masses and lesions seen, face is symmetric [de-identified] : hypertrophied masseters w/ no trigger points [FreeTextEntry8] : Obstructing cerumen was removed with a hook [FreeTextEntry9] : Obstructing cerumen was removed with a hook [de-identified] : s/p UPPP

## 2024-02-06 NOTE — ASSESSMENT
[FreeTextEntry1] : We discussed his neck pain, which isn't referred pain and doesn't seem to be bruxism/TMJ-related, carotidynia or myofascial pain syndrome. He will d/w a PM&R doc, referral provided  He is not a hypoglossal nerve stim candidate; I recommended that he try again w CPAP or an MAD.   Discussed the importance not putting qtips or other foreign objects in the ears.

## 2024-02-06 NOTE — PROCEDURE
[de-identified] : Indication: requirement for exam not possible via anterior examination; r/o referred pain After verbal consent and the administration of an aerosolized oxymetazoline/lidocaine mix, examination was performed with a flexible endoscope placed through the nose which was attached to a video monitoring system. Findings: Nasopharynx: unremarkable Soft palate, lateral and posterior pharyngeal walls: unremarkable Base of tongue & lingual tonsil: minimal retrodisplacement Vallecula: unremarkable Epiglottis: unremarkable Piriform sinuses and pharyngoesophageal junction: unremarkable Arytenoids and AE folds: severe interarytenoid edema Ventricle and false vocal folds: unremarkable True vocal folds: Smooth free edge; surface without ectasias or edema; normal movement bilaterally with good apposition in phonation Visible subglottis: unremarkable Narrow-band imaging: not used Other findings: none

## 2024-02-06 NOTE — HISTORY OF PRESENT ILLNESS
[de-identified] : Several months of achey R ear & neck pain which extends down the jaw and to his trapezius; tylenol helps, lying down makes it worse. This is constant and not shooting. No known bruxism or TMJD. No hearing loss or tinnitus, uses qtips. He had his R shoulder injected w/ cortisone by a PM&R doc which didn't help.  Severe CAESAR & he's not able to tolerate CPAP or an MAD. He's had UPPP & two septoplasties.  Inspire candidacy:   - Age >18: yes   - PAP failure: yes   - AHI <100 and obstructive apneas > 75% of AHI: unknown   - BMI <32: no   - concentric soft palate level collapse or other anatomic C/I: no   - MRI requirement: no   - pregnant: n/a

## 2024-05-08 NOTE — DISCHARGE NOTE NURSING/CASE MANAGEMENT/SOCIAL WORK - NSDPDISTO_GEN_ALL_CORE
Established Patient Progress Note    Chief Complaint:  Diabetes follow up visit    Impression & Plan:    Problem List Items Addressed This Visit          Endocrine    Type 2 diabetes mellitus with hyperglycemia, without long-term current use of insulin ([facility])       Lab Results   Component Value Date    HGBA1C 9.1 (H) 03/27/2024     HGA1C above goal. GMI 8.4% Recommend increased Jardiance to 25 mg daily and follow closely. Reviewed contacting our office for symptoms of UTI, yeast, or genital skin infection, dizziness, low blood pressure, or concern for dehydration.    BGL Reviewed:Continues on CGM     Treatment regimen:  Jardiance 25 mg daily  Januvia 100 mg daily  Metformin 1000 mg twice daily    Discussed risks/complications associated with uncontrolled diabetes including organ involvement, heart attack, stroke, death.    Advised lifestyle modifications including attention to diet including the amount and types of carbohydrates consumed and regular activity.     Call for blood sugars less than 70 mg/dl or patterns over 250 mg/dL.     Recommendation for medical identification either bracelet, necklace.    Routine follow up for diabetic eye and foot exams.     Ordered blood work to complete prior to next visit.    Send glucose logs/CGM download in 1-2 weeks for review    Follow up in 2 months.            Relevant Medications    Continuous Glucose Sensor (FreeStyle [place] 2 Sensor) MISC    Empagliflozin (Jardiance) 25 MG TABS    Other Relevant Orders    Comprehensive metabolic panel    Hemoglobin A1C       History of Present Illness:   Parviz Posey is a 78 y.o. male with a history of type 2 diabetes without long term use of insulin. Reports complications of  CKD, severe non-proliferative retinopathy with macular edema. Other significant medical history includes prostate cancer, hypertension and hyperlipidemia. UTD with diabetic eye exam will complete tomorrow. UTD with diabetic foot exams March 2024.        CGM Interpretation  Parviz MOE Posey   Device used Freestyle aleena for Personal Use  Indication: Type of Diabetes: Type 2 Diabetes  More than 72 hours of data was reviewed. Report to be scanned to chart.   Date Range: April 21-May 4, 2024  Analysis of data:   Average Glucose: 211 mg/dl  Coefficient of Variation: 20.5%  GMI 8.4%  Time in Target Range: 26%  Time Above Range: 74%  Time Below Range: 0%   Interpretation of data:   Post-prandial hyperglycemia.     Current regimen:  Jardiance 10 mg daily  Januvia 100 mg daily  Metformin 1000 mg twice daily     No ACE/ARB  Has hyperlipidemia: Taking rosuvastatin, tolerating              Thyroid disorders: Denies  Pancreatitis: denies     Patient Active Problem List   Diagnosis    Type 2 diabetes mellitus with hyperglycemia, without long-term current use of insulin (HCC)    Anemia    Pulmonary artery hypertension (HCC)    Dyslipidemia    Urinary retention    Obesity (BMI 30-39.9)    Tremor    Hypoxia    Generalized weakness    Protein-calorie malnutrition (HCC)    Bilateral pulmonary embolism (HCC)    Prostate cancer (HCC)    Factor 5 Leiden mutation, heterozygous (HCC)    Itching    CKD stage 2 due to type 2 diabetes mellitus  (HCC)    Immunization due    Diabetic retinopathy of left eye associated with type 2 diabetes mellitus (HCC)    CKD (chronic kidney disease) stage 2, GFR 60-89 ml/min    Retinopathy    Class 2 severe obesity with serious comorbidity and body mass index (BMI) of 38.0 to 38.9 in adult, unspecified obesity type (HCC)    Callus of foot         Past Surgical History:   Procedure Laterality Date    CATARACT EXTRACTION      OR TRURL ELECTROSURG RESCJ PROSTATE BLEED COMPLETE N/A 1/20/2022    Procedure: CYSTOSCOPY, TRANSURETHRAL RESECTION OF PROSTATE (TURP);  Surgeon: Flakito Parra MD;  Location: [facility];  Service: Urology    OR XCAPSL CTRC RMVL INSJ IO LENS PROSTH W/O ECP Right 8/6/2018    Procedure: EXTRACTION EXTRACAPSULAR CATARACT PHACO  INTRAOCULAR LENS (IOL);  Surgeon: Riccardo Saavedra MD;  Location: [facility] MAIN OR;  Service: Ophthalmology    WA XCAPSL CTRC RMVL INSJ IO LENS PROSTH W/O ECP Left 10/1/2018    Procedure: EXTRACTION EXTRACAPSULAR CATARACT PHACO INTRAOCULAR LENS (IOL);  Surgeon: Riccardo Saavedra MD;  Location: [facility] MAIN OR;  Service: Ophthalmology      Family History   Problem Relation Age of Onset    Colon cancer Mother     Cancer Father         lung-smoker    Lung cancer Father     Early death Brother     Mastocytosis Brother     Cancer Brother     Stomach cancer Brother     Hyperlipidemia Sister     Hypertension Sister     Diabetes type II Sister     No Known Problems Sister     No Known Problems Sister     No Known Problems Sister     No Known Problems Sister      Social History     Tobacco Use    Smoking status: Never    Smokeless tobacco: Never   Substance Use Topics    Alcohol use: Never     No Known Allergies      Current Outpatient Medications:     apixaban (Eliquis) 5 mg, Take 1 tablet by mouth twice daily, Disp: 180 tablet, Rfl: 1    Ascorbic Acid (VITAMIN C PO), Take by mouth in the morning, Disp: , Rfl:     calcium-vitamin D (OSCAL 500 + D) 500 mg-200 units per tablet, Take 1 tablet by mouth daily, Disp: , Rfl:     Continuous Blood Gluc  (FreeStyle [place] 2 [place]) CORA, Use 1 each 1 (one) time for 1 dose, Disp: 1 each, Rfl: 0    Continuous Glucose Sensor (FreeStyle [place] 2 Sensor) MISC, Inject 3 each under the skin every 14 (fourteen) days, Disp: 6 each, Rfl: 3    Cyanocobalamin (VITAMIN B-12 CR PO), Take by mouth in the morning, Disp: , Rfl:     Empagliflozin (Jardiance) 25 MG TABS, Take 1 tablet (25 mg total) by mouth every morning, Disp: 90 tablet, Rfl: 2    folic acid (FOLVITE) 1 mg tablet, Take 1 tablet by mouth once daily, Disp: 90 tablet, Rfl: 1    metFORMIN (GLUCOPHAGE-XR) 500 mg 24 hr tablet, Take 2 tablets by mouth twice daily, Disp: 360 tablet, Rfl: 0    primidone (MYSOLINE) 50 mg tablet, TAKE 2 TABLETS BY  MOUTH EVERY 12 HOURS, Disp: 120 tablet, Rfl: 0    propranolol (INDERAL LA) 120 mg 24 hr capsule, Take 1 capsule (120 mg total) by mouth daily After breakfast, Disp: 30 capsule, Rfl: 11    rosuvastatin (CRESTOR) 10 MG tablet, Take 1 tablet by mouth once daily, Disp: 90 tablet, Rfl: 0    sitaGLIPtin (Januvia) 100 mg tablet, Take 1 tablet (100 mg total) by mouth daily, Disp: 90 tablet, Rfl: 3    polyethylene glycol (MIRALAX) 17 g packet, Take 17 g by mouth daily as needed (constipation) for up to 14 days, Disp: 14 each, Rfl: 0    senna (SENOKOT) 8.6 mg, Take 2 tablets (17.2 mg total) by mouth daily at bedtime, Disp: 60 tablet, Rfl: 0    tamsulosin (FLOMAX) 0.4 mg, Take 0.4 mg by mouth 2 (two) times a day (Patient not taking: Reported on 3/26/2024), Disp: , Rfl:     Review of Systems  Constitutional: Negative for activity change, appetite change, fatigue and unexpected weight change.   HENT: Negative for ear pain, sore throat, trouble swallowing and voice change.    Eyes: Negative for visual disturbance.   Respiratory: Negative for cough and shortness of breath.    Cardiovascular: Negative for chest pain and palpitations.   Gastrointestinal: Negative for abdominal distention, abdominal pain, constipation, diarrhea and vomiting.   Endocrine: Negative for cold intolerance, heat intolerance, polydipsia and polyuria.   Genitourinary: Negative for dysuria and hematuria.   Musculoskeletal: Negative for arthralgias and back pain.   Skin: Negative for color change and rash.   Neurological: Negative for weakness. Experiences tremors.   All other systems reviewed and are negative.      Physical Exam:  Body mass index is 38.44 kg/m².  Wt 98.4 kg (217 lb)   BMI 38.44 kg/m²    Wt Readings from Last 3 Encounters:   05/08/24 98.4 kg (217 lb)   03/26/24 96.6 kg (213 lb)   03/20/24 98.4 kg (217 lb)        Physical Exam  Vitals reviewed.   Constitutional:       Appearance: Normal appearance.   Cardiovascular:      Rate and Rhythm:  Normal rate and regular rhythm.      Pulses: Normal pulses.      Heart sounds: Normal heart sounds.   Pulmonary:      Effort: Pulmonary effort is normal.      Breath sounds: Normal breath sounds.   Skin:     General: Skin is warm and dry.      Capillary Refill: Capillary refill takes less than 2 seconds.   Neurological:      General: No focal deficit present.      Mental Status: He is alert and oriented to person, place, and time.   Psychiatric:         Mood and Affect: Mood normal.         Behavior: Behavior normal.       Labs:   Lab Results   Component Value Date    HGBA1C 9.1 (H) 03/27/2024    HGBA1C 9.0 (H) 10/26/2023    HGBA1C 8.1 (H) 07/11/2023     Lab Results   Component Value Date    CREATININE 0.92 03/27/2024    CREATININE 0.89 10/26/2023    CREATININE 0.94 07/11/2023    BUN 25 03/27/2024    K 4.6 03/27/2024    CL 99 03/27/2024    CO2 33 (H) 03/27/2024     eGFR   Date Value Ref Range Status   03/27/2024 78 ml/min/1.73sq m Final     Lab Results   Component Value Date    HDL 33 (L) 07/11/2023    TRIG 287 (H) 07/11/2023     Lab Results   Component Value Date    ALT 14 03/27/2024    AST 10 (L) 03/27/2024    ALKPHOS 68 03/27/2024     Lab Results   Component Value Date    RMP4NBGOJYUF 3.260 07/11/2023    UTV6HUQUUSIL 3.240 03/16/2023    KBB0PDPYXIEJ 2.495 06/29/2022     Lab Results   Component Value Date    FREET4 0.83 06/29/2022       Discussed with the patient and all questioned fully answered. He will call me if any problems arise.    Follow-up appointment in 2 months.     Counseled patient on diagnostic results, prognosis, risk and benefit of treatment options, instruction for management, importance of treatment compliance, Risk  factor reduction and impressions    BLAZE Patterson     Home

## 2024-11-18 NOTE — ED ADULT TRIAGE NOTE - BMI (KG/M2)
Appointment cancelled via portal / [facility].    CDM Team is communicating with patient via portal to re-schedule.    Attempt #1  
38

## 2025-01-06 ENCOUNTER — APPOINTMENT (OUTPATIENT)
Dept: OTOLARYNGOLOGY | Facility: CLINIC | Age: 55
End: 2025-01-06
Payer: COMMERCIAL

## 2025-01-06 ENCOUNTER — NON-APPOINTMENT (OUTPATIENT)
Age: 55
End: 2025-01-06

## 2025-01-06 VITALS
DIASTOLIC BLOOD PRESSURE: 76 MMHG | TEMPERATURE: 97.2 F | OXYGEN SATURATION: 96 % | HEART RATE: 71 BPM | SYSTOLIC BLOOD PRESSURE: 142 MMHG | WEIGHT: 275 LBS | BODY MASS INDEX: 37.25 KG/M2 | HEIGHT: 72 IN

## 2025-01-06 DIAGNOSIS — J34.3 HYPERTROPHY OF NASAL TURBINATES: ICD-10-CM

## 2025-01-06 DIAGNOSIS — G47.33 OBSTRUCTIVE SLEEP APNEA (ADULT) (PEDIATRIC): ICD-10-CM

## 2025-01-06 DIAGNOSIS — H61.23 IMPACTED CERUMEN, BILATERAL: ICD-10-CM

## 2025-01-06 PROCEDURE — 31231 NASAL ENDOSCOPY DX: CPT

## 2025-01-06 PROCEDURE — 99214 OFFICE O/P EST MOD 30 MIN: CPT | Mod: 25
